# Patient Record
(demographics unavailable — no encounter records)

---

## 2024-10-23 NOTE — ASSESSMENT
[FreeTextEntry1] : 1)mild CAD  CCTA calcified plaque in LAD with minimal stenosis  echo normal EF , unremarkable   continue  asa and statin   2)DL   change statin to rosuvastatin 20 mg daily  Discussed in detail healthy lifestyle with healthy diet , exercise and weight loss    Follow up in 6 months.

## 2024-10-23 NOTE — PHYSICAL EXAM
[Normal] : no rash, no skin lesions [Moves all extremities] : moves all extremities [Alert and Oriented] : alert and oriented

## 2024-10-23 NOTE — HISTORY OF PRESENT ILLNESS
[FreeTextEntry1] : I have the pleasure to see Mr Orosco for cardiology follow up .  Visited  ED 8/14/23 for chest pain and RLQ pain underwent workup including EKG, CE unremarkable and CCTA shower minor to mild disease in LAD.  CT abdomen pelvis showed renal calculus  echo done normal EF, unremarkable  Currently patient is stable, his chest pain was atypical currently resolved, denies sob, miller or palpitations, no syncope  EKG sinus rhythm no acute ischemia.  Labs reviewed LDL 84 compliant with diet .